# Patient Record
Sex: FEMALE | Race: WHITE | NOT HISPANIC OR LATINO | Employment: FULL TIME | URBAN - METROPOLITAN AREA
[De-identification: names, ages, dates, MRNs, and addresses within clinical notes are randomized per-mention and may not be internally consistent; named-entity substitution may affect disease eponyms.]

---

## 2022-07-24 ENCOUNTER — HOSPITAL ENCOUNTER (EMERGENCY)
Facility: HOSPITAL | Age: 22
Discharge: HOME | End: 2022-07-24
Attending: EMERGENCY MEDICINE
Payer: COMMERCIAL

## 2022-07-24 VITALS
SYSTOLIC BLOOD PRESSURE: 133 MMHG | BODY MASS INDEX: 18.33 KG/M2 | HEART RATE: 73 BPM | OXYGEN SATURATION: 100 % | HEIGHT: 65 IN | RESPIRATION RATE: 15 BRPM | WEIGHT: 110 LBS | DIASTOLIC BLOOD PRESSURE: 80 MMHG | TEMPERATURE: 98 F

## 2022-07-24 DIAGNOSIS — R11.2 NON-INTRACTABLE VOMITING WITH NAUSEA, UNSPECIFIED VOMITING TYPE: Primary | ICD-10-CM

## 2022-07-24 DIAGNOSIS — R19.7 DIARRHEA, UNSPECIFIED TYPE: ICD-10-CM

## 2022-07-24 LAB
ALBUMIN SERPL BCP-MCNC: 3.9 G/DL (ref 3.5–5.2)
ALP SERPL-CCNC: 47 U/L (ref 55–135)
ALT SERPL W/O P-5'-P-CCNC: 8 U/L (ref 10–44)
ANION GAP SERPL CALC-SCNC: 9 MMOL/L (ref 8–16)
AST SERPL-CCNC: 16 U/L (ref 10–40)
B-HCG UR QL: NEGATIVE
B-OH-BUTYR BLD STRIP-SCNC: 1.3 MMOL/L (ref 0–0.5)
BACTERIA #/AREA URNS AUTO: NORMAL /HPF
BASOPHILS # BLD AUTO: 0.05 K/UL (ref 0–0.2)
BASOPHILS NFR BLD: 0.8 % (ref 0–1.9)
BILIRUB SERPL-MCNC: 1.5 MG/DL (ref 0.1–1)
BILIRUB UR QL STRIP: NEGATIVE
BUN SERPL-MCNC: <3 MG/DL (ref 6–20)
CALCIUM SERPL-MCNC: 8.8 MG/DL (ref 8.7–10.5)
CHLORIDE SERPL-SCNC: 111 MMOL/L (ref 95–110)
CLARITY UR REFRACT.AUTO: CLEAR
CO2 SERPL-SCNC: 21 MMOL/L (ref 23–29)
COLOR UR AUTO: ABNORMAL
CREAT SERPL-MCNC: 0.7 MG/DL (ref 0.5–1.4)
CTP QC/QA: YES
DIFFERENTIAL METHOD: NORMAL
EOSINOPHIL # BLD AUTO: 0.1 K/UL (ref 0–0.5)
EOSINOPHIL NFR BLD: 1.8 % (ref 0–8)
ERYTHROCYTE [DISTWIDTH] IN BLOOD BY AUTOMATED COUNT: 12.6 % (ref 11.5–14.5)
EST. GFR  (AFRICAN AMERICAN): >60 ML/MIN/1.73 M^2
EST. GFR  (NON AFRICAN AMERICAN): >60 ML/MIN/1.73 M^2
GLUCOSE SERPL-MCNC: 70 MG/DL (ref 70–110)
GLUCOSE UR QL STRIP: NEGATIVE
HCT VFR BLD AUTO: 38.5 % (ref 37–48.5)
HGB BLD-MCNC: 13.2 G/DL (ref 12–16)
HGB UR QL STRIP: ABNORMAL
HIV1+2 IGG SERPL QL IA.RAPID: NORMAL
IMM GRANULOCYTES # BLD AUTO: 0.01 K/UL (ref 0–0.04)
IMM GRANULOCYTES NFR BLD AUTO: 0.2 % (ref 0–0.5)
KETONES UR QL STRIP: ABNORMAL
LEUKOCYTE ESTERASE UR QL STRIP: NEGATIVE
LIPASE SERPL-CCNC: 23 U/L (ref 4–60)
LYMPHOCYTES # BLD AUTO: 1.6 K/UL (ref 1–4.8)
LYMPHOCYTES NFR BLD: 24.5 % (ref 18–48)
MCH RBC QN AUTO: 29.8 PG (ref 27–31)
MCHC RBC AUTO-ENTMCNC: 34.3 G/DL (ref 32–36)
MCV RBC AUTO: 87 FL (ref 82–98)
MICROSCOPIC COMMENT: NORMAL
MONOCYTES # BLD AUTO: 0.6 K/UL (ref 0.3–1)
MONOCYTES NFR BLD: 8.7 % (ref 4–15)
NEUTROPHILS # BLD AUTO: 4.2 K/UL (ref 1.8–7.7)
NEUTROPHILS NFR BLD: 64 % (ref 38–73)
NITRITE UR QL STRIP: NEGATIVE
NRBC BLD-RTO: 0 /100 WBC
PH UR STRIP: 5 [PH] (ref 5–8)
PLATELET # BLD AUTO: 177 K/UL (ref 150–450)
PMV BLD AUTO: 12.6 FL (ref 9.2–12.9)
POTASSIUM SERPL-SCNC: 3.2 MMOL/L (ref 3.5–5.1)
PROT SERPL-MCNC: 6.6 G/DL (ref 6–8.4)
PROT UR QL STRIP: NEGATIVE
RBC # BLD AUTO: 4.43 M/UL (ref 4–5.4)
RBC #/AREA URNS AUTO: 1 /HPF (ref 0–4)
SODIUM SERPL-SCNC: 141 MMOL/L (ref 136–145)
SP GR UR STRIP: 1.01 (ref 1–1.03)
SQUAMOUS #/AREA URNS AUTO: 3 /HPF
URN SPEC COLLECT METH UR: ABNORMAL
WBC # BLD AUTO: 6.54 K/UL (ref 3.9–12.7)
WBC #/AREA URNS AUTO: 0 /HPF (ref 0–5)

## 2022-07-24 PROCEDURE — 99285 EMERGENCY DEPT VISIT HI MDM: CPT | Mod: ,,, | Performed by: EMERGENCY MEDICINE

## 2022-07-24 PROCEDURE — 85025 COMPLETE CBC W/AUTO DIFF WBC: CPT

## 2022-07-24 PROCEDURE — 99285 PR EMERGENCY DEPT VISIT,LEVEL V: ICD-10-PCS | Mod: ,,, | Performed by: EMERGENCY MEDICINE

## 2022-07-24 PROCEDURE — 25000003 PHARM REV CODE 250

## 2022-07-24 PROCEDURE — 86703 HIV-1/HIV-2 1 RESULT ANTBDY: CPT

## 2022-07-24 PROCEDURE — 86803 HEPATITIS C AB TEST: CPT | Performed by: PHYSICIAN ASSISTANT

## 2022-07-24 PROCEDURE — 96374 THER/PROPH/DIAG INJ IV PUSH: CPT

## 2022-07-24 PROCEDURE — 80053 COMPREHEN METABOLIC PANEL: CPT

## 2022-07-24 PROCEDURE — 87389 HIV-1 AG W/HIV-1&-2 AB AG IA: CPT | Performed by: PHYSICIAN ASSISTANT

## 2022-07-24 PROCEDURE — 63600175 PHARM REV CODE 636 W HCPCS

## 2022-07-24 PROCEDURE — 81025 URINE PREGNANCY TEST: CPT

## 2022-07-24 PROCEDURE — 83690 ASSAY OF LIPASE: CPT

## 2022-07-24 PROCEDURE — 25500020 PHARM REV CODE 255: Performed by: EMERGENCY MEDICINE

## 2022-07-24 PROCEDURE — 81001 URINALYSIS AUTO W/SCOPE: CPT

## 2022-07-24 PROCEDURE — 99285 EMERGENCY DEPT VISIT HI MDM: CPT | Mod: 25

## 2022-07-24 PROCEDURE — 82010 KETONE BODYS QUAN: CPT

## 2022-07-24 RX ORDER — ONDANSETRON 4 MG/1
4 TABLET, ORALLY DISINTEGRATING ORAL EVERY 8 HOURS PRN
Qty: 15 TABLET | Refills: 0 | Status: SHIPPED | OUTPATIENT
Start: 2022-07-24

## 2022-07-24 RX ORDER — ONDANSETRON 2 MG/ML
4 INJECTION INTRAMUSCULAR; INTRAVENOUS
Status: COMPLETED | OUTPATIENT
Start: 2022-07-24 | End: 2022-07-24

## 2022-07-24 RX ADMIN — IOHEXOL 100 ML: 350 INJECTION, SOLUTION INTRAVENOUS at 10:07

## 2022-07-24 RX ADMIN — ONDANSETRON 4 MG: 2 INJECTION INTRAMUSCULAR; INTRAVENOUS at 09:07

## 2022-07-24 RX ADMIN — POTASSIUM BICARBONATE 20 MEQ: 391 TABLET, EFFERVESCENT ORAL at 11:07

## 2022-07-24 NOTE — ED NOTES
Patient brought back from CT connected to monitor. Pillow provided to patient and repositioned for comfort.    Hourly rounding complete. Patient resting in stretcher and is in NAD at this time. Pt is awake alert and oriented x4, VSS. Pt denies pain at this time. Pt updated on POC. Bed low and locked, SR up x2, call bell in patient reach. Pt remains on continuous cardiac monitor, continuous pulse ox, and auto BP cuff. Pt voices no needs at this time.

## 2022-07-24 NOTE — ED PROVIDER NOTES
Encounter Date: 7/24/2022       History     Chief Complaint   Patient presents with    Nausea    Vomiting     Pt is an employee on a [employer]. She has had nausea, vomiting and abdominal cramping for over a month. Pt diagnosed with metabolic keto acidosis with possible appendicitis.      Elyse Vazquez is a 22 y.o. female with no significant medical history presenting with chief complaint of nausea and vomiting.  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  [facility].  Patient states last Saturday she developed severe nausea, vomiting, diarrhea and abdominal cramping.  Her abdominal pain is periumbilical and radiates to the right side.  She has had emesis 2 times today is no longer having diarrhea.  While in the [facility] she was noted to have low blood sugars and low electrolytes.  Patient also had 1 episode of syncope while abort the ship.  She was also noted to have a metabolic acidosis        Review of patient's allergies indicates:  No Known Allergies  History reviewed. No pertinent past medical history.  History reviewed. No pertinent surgical history.  History reviewed. No pertinent family history.  Social History     Tobacco Use    Smoking status: Current Every Day Smoker     Types: Cigarettes    Smokeless tobacco: Never Used   Substance Use Topics    Alcohol use: Yes    Drug use: Never     Review of Systems   Constitutional: Negative for fever.   HENT: Negative for sore throat.    Eyes: Negative for visual disturbance.   Respiratory: Negative for shortness of breath.    Cardiovascular: Negative for chest pain.   Gastrointestinal: Positive for abdominal pain, diarrhea, nausea and vomiting.   Genitourinary: Negative for dysuria.   Musculoskeletal: Negative for gait problem.   Skin: Negative for rash.   Neurological: Negative for light-headedness.        Physical Exam     Initial Vitals [07/24/22 0815]   BP Pulse Resp Temp SpO2   117/86 72 15 98.2 °F (36.8 °C) 98 %      MAP       --         Physical Exam    Nursing note and vitals reviewed.  Constitutional: She appears well-developed and well-nourished. She is not diaphoretic. No distress.   HENT:   Head: Normocephalic and atraumatic.   White discharge concerning for thrush in patient's mouth   Eyes: Conjunctivae are normal. No scleral icterus.   Neck: Neck supple. No tracheal deviation present.   Cardiovascular: Normal rate, regular rhythm, normal heart sounds and intact distal pulses. Exam reveals no gallop and no friction rub.    No murmur heard.  Pulmonary/Chest: Breath sounds normal. No stridor. No respiratory distress. She has no wheezes. She has no rhonchi. She has no rales.   Abdominal: Abdomen is soft. Bowel sounds are normal. She exhibits no distension and no mass. There is abdominal tenderness ( periumbilical and right upper and lower quadrant). There is rebound (Periumbilical). There is no guarding.   Musculoskeletal:         General: No tenderness or edema.      Cervical back: Neck supple.     Neurological: She is alert and oriented to person, place, and time. GCS score is 15. GCS eye subscore is 4. GCS verbal subscore is 5. GCS motor subscore is 6.   Skin: Skin is warm and dry. Capillary refill takes less than 2 seconds. No rash noted. No erythema.   Psychiatric: She has a normal mood and affect. Thought content normal.         ED Course   Procedures  Labs Reviewed   COMPREHENSIVE METABOLIC PANEL - Abnormal; Notable for the following components:       Result Value    Potassium 3.2 (*)     Chloride 111 (*)     CO2 21 (*)     BUN <3 (*)     Total Bilirubin 1.5 (*)     Alkaline Phosphatase 47 (*)     ALT 8 (*)     All other components within normal limits    Narrative:     Release to patient->Immediate   URINALYSIS, REFLEX TO URINE CULTURE - Abnormal; Notable for the following components:     Ketones, UA 1+ (*)     Occult Blood UA 1+ (*)     All other components within normal limits    Narrative:     Specimen Source->Urine   BETA - HYDROXYBUTYRATE, SERUM - Abnormal; Notable for the following components:    Beta-Hydroxybutyrate 1.3 (*)     All other components within normal limits   CBC W/ AUTO DIFFERENTIAL    Narrative:     Release to patient->Immediate   LIPASE    Narrative:     Release to patient->Immediate   RAPID HIV   URINALYSIS MICROSCOPIC    Narrative:     Specimen Source->Urine   HIV 1 / 2 ANTIBODY   HEPATITIS C ANTIBODY   POCT URINE PREGNANCY          Imaging Results          CT Abdomen Pelvis With Contrast (Final result)  Result time 07/24/22 10:53:17    Final result by Janet Sanchez MD (07/24/22 10:53:17)                 Impression:      1.  Cholelithiasis.    2.  Ring-enhancing 1.5 cm cystic structure associated with the right ovary, favor corpus luteum cyst.  Note that the differential diagnosis could include ovarian ectopic pregnancy, although less favored.  If correlation with serology/urine pregnancy test and other clinically relevant information is not conclusive, consider further evaluation with pelvic ultrasound.      Electronically signed by: Janet Sanchez  Date:    07/24/2022  Time:    10:53             Narrative:    EXAMINATION:  CT ABDOMEN PELVIS WITH CONTRAST    CLINICAL HISTORY:  RLQ abdominal pain (Age >= 14y);    TECHNIQUE:  Low dose axial images, sagittal and coronal reformations were obtained from the lung bases to the pubic symphysis following the IV administration of 100 mL of Omnipaque 350 .  Oral contrast was not given.    COMPARISON:  None.    FINDINGS:  The thoracic base is partially imaged.  No significant abnormalities were identified.    There is dependent, layering hyperdense material within the gallbladder lumen, favor gallstones versus sludge.  The liver, spleen, pancreas, adrenal glands, and kidneys appear unremarkable.  There is no hydronephrosis.  The urinary  bladder is not fully distended, but otherwise appears unremarkable.    Although there are scattered segments of mild bowel wall thickening, these appear related to under distension.  The visualized appendix is normal in caliber and is without apparent wall thickening (coronal series 601, images 74-78).    An intrauterine device is present and appears normally situated.  The left ovary appears unremarkable.  The right ovary is mildly enlarged and is associated with a ring-enhancing 1.5 cm cystic structure, the latter favored to represent a corpus luteum cyst.    No lymphadenopathy.    No free air.    No organized fluid collections.    The aorta, inferior vena cava, and their major branches appear unremarkable.    The bones are normal in appearance for the age of patient.                                 Medications   ondansetron injection 4 mg (4 mg Intravenous Given 7/24/22 0907)   iohexoL (OMNIPAQUE 350) injection 100 mL (100 mLs Intravenous Given 7/24/22 1027)   potassium bicarbonate disintegrating tablet 20 mEq (20 mEq Oral Given 7/24/22 1147)     Medical Decision Making:   Initial Assessment:   22-year-old female here with nausea, vomiting, diarrhea and abdominal pain  Differential Diagnosis:   Gastroenteritis, appendicitis, cholecystitis, gynecologic/obstetric pathology  Clinical Tests:   Lab Tests: Ordered and Reviewed  Radiological Study: Ordered and Reviewed  ED Management:  Abdominal pain workup started.  Zofran ordered for nausea.  Urinalysis showing 1+ ketones and 1+ blood.  CMP showing hypokalemia and mild metabolic acidosis, will replete potassium orally.  Beta hydroxybutyrate slightly elevated at 1.3 likely secondary to decreased p.o. intake.  Urine pregnancy test negative.  CBC lipase within normal limits.  CT abdomen pelvis showing right ovarian ring enhancing cyst consistent with a theca leutin cyst as well as cholelithiasis.  One or both of these findings are likely contributing to patient's  abdominal pain.  Will discharge patient home with a prescription for Zofran and instructions to follow-up with her OBGYN.  Discussed results, diagnosis, and treatment plan with patient; advised close follow-up with PCP. Reviewed strict return precautions. Patient confirms understanding and ability to comply. Patient was given the opportunity to ask questions prior to discharge and all questions answered.                       Clinical Impression:   Final diagnoses:  [R11.2] Non-intractable vomiting with nausea, unspecified vomiting type (Primary)  [R19.7] Diarrhea, unspecified type          ED Disposition Condition    Discharge Stable        ED Prescriptions     Medication Sig Dispense Start Date End Date Auth. Provider    ondansetron (ZOFRAN-ODT) 4 MG TbDL Take 1 tablet (4 mg total) by mouth every 8 (eight) hours as needed (Nausea/vomiting). 15 tablet 7/24/2022  Skyler Gaviria MD        Follow-up Information    None          Skyler Gaviria MD  Resident  07/24/22 6002

## 2022-07-24 NOTE — ED NOTES
I-STAT Chem-8+ Results:   Value Reference Range   Sodium 143 136-145 mmol/L   Potassium  3.2 3.5-5.1 mmol/L   Chloride 106  mmol/L   Ionized Calcium 1.19 1.06-1.42 mmol/L   CO2 (measured) 21 23-29 mmol/L   Glucose 74  mg/dL   BUN <3 6-30 mg/dL   Creatinine 0.6 0.5-1.4 mg/dL   Hematocrit 38 36-54%

## 2022-07-24 NOTE — DISCHARGE INSTRUCTIONS
It was a pleasure taking care of you today!     Diagnosis: Nausea and Vomiting  -take Zofran as needed for nausea and vomiting  -follow-up with primary doctor and Ob/Gyn at home  -the read from her CT scan is copied below, it showed a 1.5 cm cyst on right ovary, recommend following up with a pelvic ultrasound at home    Follow-Up Plan:  - Follow-up with primary care doctor within 3 - 5 days to discuss your recent ER visit and any additional concerns that you may have.  - Additional testing and/or evaluation as directed by your primary doctor    Return to the Emergency Department for symptoms including but not limited to: persistence or worsening of symptoms, shortness of breath or chest pain, inability to drink without vomiting, passing out/fainting/ loss of consciousness, or if you have other concerns.     CT Abdomen/Pelvis 7/24/22  FINDINGS:  The thoracic base is partially imaged.  No significant abnormalities were identified.     There is dependent, layering hyperdense material within the gallbladder lumen, favor gallstones versus sludge.  The liver, spleen, pancreas, adrenal glands, and kidneys appear unremarkable.  There is no hydronephrosis.  The urinary bladder is not fully distended, but otherwise appears unremarkable.     Although there are scattered segments of mild bowel wall thickening, these appear related to under distension.  The visualized appendix is normal in caliber and is without apparent wall thickening (coronal series 601, images 74-78).     An intrauterine device is present and appears normally situated.  The left ovary appears unremarkable.  The right ovary is mildly enlarged and is associated with a ring-enhancing 1.5 cm cystic structure, the latter favored to represent a corpus luteum cyst.     No lymphadenopathy.     No free air.     No organized fluid collections.     The aorta, inferior vena cava, and their major branches appear unremarkable.     The bones are normal in appearance for  the age of patient.     Impression:     1.  Cholelithiasis.     2.  Ring-enhancing 1.5 cm cystic structure associated with the right ovary, favor corpus luteum cyst.  Note that the differential diagnosis could include ovarian ectopic pregnancy, although less favored.  If correlation with serology/urine pregnancy test and other clinically relevant information is not conclusive, consider further evaluation with pelvic ultrasound.

## 2022-07-24 NOTE — ED NOTES
"Patient supine lying in bed with visitors at bedside. Reporting lessening of pain to "not that much at all". Denies any need for pain interventions at this time. Educated on pain management and verbalized understanding. Denies any other needs at this time and call light within reach.   "

## 2022-07-24 NOTE — ED TRIAGE NOTES
Patient presents to the ED today with reports of abdominal pain nausea and vomiting x1 week. Patient states has had this problem intermittently for a year. Patient from [place]. Patient describes pain and RUQ pain rates pain 2/10. Patient endorses nausea at this time.

## 2022-07-25 LAB — HCV AB SERPL QL IA: NEGATIVE

## 2022-07-26 LAB — HIV 1+2 AB+HIV1 P24 AG SERPL QL IA: NEGATIVE
